# Patient Record
Sex: FEMALE | Race: WHITE | Employment: FULL TIME | URBAN - METROPOLITAN AREA
[De-identification: names, ages, dates, MRNs, and addresses within clinical notes are randomized per-mention and may not be internally consistent; named-entity substitution may affect disease eponyms.]

---

## 2017-09-06 ENCOUNTER — HOSPITAL ENCOUNTER (OUTPATIENT)
Age: 52
Setting detail: SPECIMEN
Discharge: HOME | End: 2017-09-06
Payer: COMMERCIAL

## 2017-09-06 ENCOUNTER — OFFICE VISIT (OUTPATIENT)
Dept: OBGYN CLINIC | Age: 52
End: 2017-09-06
Payer: COMMERCIAL

## 2017-09-06 VITALS — WEIGHT: 168 LBS | DIASTOLIC BLOOD PRESSURE: 64 MMHG | SYSTOLIC BLOOD PRESSURE: 120 MMHG

## 2017-09-06 DIAGNOSIS — Z01.419 VISIT FOR GYNECOLOGIC EXAMINATION: Primary | ICD-10-CM

## 2017-09-06 DIAGNOSIS — Z12.31 VISIT FOR SCREENING MAMMOGRAM: ICD-10-CM

## 2017-09-06 PROCEDURE — 99396 PREV VISIT EST AGE 40-64: CPT | Performed by: OBSTETRICS & GYNECOLOGY

## 2017-09-06 RX ORDER — NEOMYCIN SULFATE, POLYMYXIN B SULFATE AND DEXAMETHASONE 3.5; 10000; 1 MG/ML; [USP'U]/ML; MG/ML
SUSPENSION/ DROPS OPHTHALMIC
COMMUNITY
Start: 2017-08-25 | End: 2017-09-06 | Stop reason: ALTCHOICE

## 2017-09-07 LAB
HPV SAMPLE: NORMAL
HPV SOURCE: NORMAL
HPV, GENOTYPE 16: NOT DETECTED
HPV, GENOTYPE 18: NOT DETECTED
HPV, HIGH RISK OTHER: NOT DETECTED
HPV, INTERPRETATION: NORMAL

## 2017-09-11 LAB — CYTOLOGY REPORT: NORMAL

## 2018-11-13 ENCOUNTER — HOSPITAL ENCOUNTER (OUTPATIENT)
Dept: MAMMOGRAPHY | Age: 53
Discharge: HOME | End: 2018-11-15
Payer: COMMERCIAL

## 2018-11-13 DIAGNOSIS — Z12.39 BREAST CANCER SCREENING: ICD-10-CM

## 2018-11-13 PROCEDURE — 77063 BREAST TOMOSYNTHESIS BI: CPT

## 2021-05-25 ENCOUNTER — HOSPITAL ENCOUNTER (OUTPATIENT)
Dept: MAMMOGRAPHY | Age: 56
Discharge: HOME | End: 2021-05-27
Payer: COMMERCIAL

## 2021-05-25 DIAGNOSIS — Z12.31 VISIT FOR SCREENING MAMMOGRAM: ICD-10-CM

## 2021-05-25 PROCEDURE — 77063 BREAST TOMOSYNTHESIS BI: CPT

## 2022-12-09 ENCOUNTER — HOSPITAL ENCOUNTER (OUTPATIENT)
Dept: MAMMOGRAPHY | Age: 57
Discharge: HOME | End: 2022-12-09
Payer: COMMERCIAL

## 2022-12-09 VITALS — WEIGHT: 185 LBS | HEIGHT: 61 IN | BODY MASS INDEX: 34.93 KG/M2

## 2022-12-09 DIAGNOSIS — Z12.31 SCREENING MAMMOGRAM FOR BREAST CANCER: ICD-10-CM

## 2022-12-09 PROCEDURE — 77067 SCR MAMMO BI INCL CAD: CPT

## 2025-07-21 ENCOUNTER — HOSPITAL ENCOUNTER (OUTPATIENT)
Age: 60
Setting detail: THERAPIES SERIES
End: 2025-07-21
Payer: COMMERCIAL

## 2025-07-23 ENCOUNTER — HOSPITAL ENCOUNTER (OUTPATIENT)
Age: 60
Setting detail: THERAPIES SERIES
Discharge: HOME | End: 2025-07-23
Payer: COMMERCIAL

## 2025-07-23 PROCEDURE — 97162 PT EVAL MOD COMPLEX 30 MIN: CPT

## 2025-07-23 PROCEDURE — 97110 THERAPEUTIC EXERCISES: CPT

## 2025-07-23 NOTE — CONSULTS
[facility]  [address], [address]  [facility] [address]  [phone]  F: [phone]     Physical Therapy Upper Extremity Evaluation    Date:  2025  Patient: Giovanna Diop  : 1965  MRN: [id number]  Physician: Matthew Parada MD     Insurance: [employer]; $2000.00 ded/$1317.18 left/100% AFTER DED MET   VISITS BMN   Medical Diagnosis: (R) biceps tendinitis M75.21  Rehab Codes: (R) elbow pain M25.521   Onset Date: referral 7/15/25   Next 's appt: PRN  Visit Count:    Cancel/No Show: 0/0    Subjective:   Patient presents to therapy with complaints of pain in the (R) anterior shoulder and (R) biceps region.  Patient noted onset of symptoms starting about 3 weeks ago with doing repetitive overhead lifting of weights- including  type press with 15# weight.  Patient noted increased complaints of pain since that time- which prompted MD appointment and referral to therapy dated 7/15/25.  Patient noted that she has not been doing over the counter anti inflammatory medication as prescribed, but has been resting/avoiding overhead activity.  Patient currently notes difficulty with overhead reaching over more than ~ 80 degrees elevation of the shoulder, is limited with laying on side including on the (R) side, and was limited with full strength testing with clinical testing.  Additionally noting intermittent symptoms into the (R) forearm as well.  CC: complaints of (R) sided anterior shoulder joint discomfort, (R) biceps discomfort  HPI: insidious onset of symptoms with overuse ~3 weeks ago prompted MD appointment and referral to therapy dated 7/15/25    PMHx: [] Unremarkable [] Diabetes [] HTN  [] Pacemaker   [] MI/Heart Problems [] Cancer [] Arthritis [] Other:              [x] Refer to full medical chart  In [facility]     Medical History    No past medical history on file.      Other Medical History    Diagnosis Date Comment Source

## 2025-07-28 ENCOUNTER — HOSPITAL ENCOUNTER (OUTPATIENT)
Age: 60
Setting detail: THERAPIES SERIES
Discharge: HOME | End: 2025-07-28
Payer: COMMERCIAL

## 2025-07-28 PROCEDURE — 97110 THERAPEUTIC EXERCISES: CPT

## 2025-07-28 NOTE — FLOWSHEET NOTE
[facility]  [address], [address]  [place] [facility] [address]  [phone]  F: [phone]     Physical Therapy Daily Treatment Note    Date:  2025  Patient Name:  Giovanna Diop    :  1965  MRN: [id number]  Physician:  Matthew Parada MD                                 Insurance: [employer]; $2000.00 ded/$1317.18 left/100% AFTER DED MET   VISITS BMN   Medical Diagnosis: (R) biceps tendinitis M75.21                 Rehab Codes: (R) elbow pain M25.521   Onset Date: referral 7/15/25             Next 's appt: PRN  Visit# / total visits: ; Progress note  patient due at visit 10     Cancels/No Shows: 0/0    Subjective:    Pain:  [x] Yes  [] No Location: right bicep elbow  Pain Rating: (0-10 scale) 6 /10  Pain altered Tx:  [x] No  [] Yes  Action:  Comments:     She presents noting she feels a bit better today- able to lift right UE overhead with less discomfort today. She also feels tylenol/ ibuprofen seems to be helpful now (neither had provided relief upon initially injuring UE).     Objective:         Precautions:    none      Treatment provided:         Exercise Reps/ Time Weight/ Level Comments                      Biceps curls supinated, neutral 15x 4#     Rows 3 way X15 each red     Fwd punches  X15  red Added 7/28         diagonal flex/ext x10 No weight flex/ yellow band for ext Added 7/28   Shoulder extension 2 x 10 red           AROM flexion/ abd? X15 each No weight Added 7/28   Prone ext/abd/flex X10 each  Added 7/28         Right shoulder mobs Completed x4' plus PROM                         Response to treatment: Some slight discomfort at end range stretching and with some of the tubing/ prone ex's, however not enough to stop exercise. Encouraged patient to ice shoulder as needed (not completed in clinic today per time restrictions/ needed to RTW).       Treatment Charges: Mins Units   []  Modalities     [x]  Ther Exercise 23' 2

## 2025-07-31 ENCOUNTER — HOSPITAL ENCOUNTER (OUTPATIENT)
Age: 60
Setting detail: THERAPIES SERIES
Discharge: HOME | End: 2025-07-31
Payer: COMMERCIAL

## 2025-07-31 PROCEDURE — 97140 MANUAL THERAPY 1/> REGIONS: CPT

## 2025-07-31 PROCEDURE — 97110 THERAPEUTIC EXERCISES: CPT

## 2025-07-31 NOTE — FLOWSHEET NOTE
[facility]  Therapy  [address], [address]  [place] [facility] [address]  [phone]  F: [phone]     Physical Therapy Daily Treatment Note    Date:  2025  Patient Name:  Giovanna Diop    :  1965  MRN: [id number]  Physician:  Matthew Parada MD                                 Insurance: [employer]; $2000.00 ded/$1317.18 left/100% AFTER DED MET   VISITS BMN   Medical Diagnosis: (R) biceps tendinitis M75.21                 Rehab Codes: (R) elbow pain M25.521   Onset Date: referral 7/15/25             Next 's appt: PRN    Visit# / total visits: 3/12; Progress note  patient due at visit 10       Cancels/No Shows: 0/0    Subjective:    Pain:  [x] Yes  [] No Location: right bicep elbow  Pain Rating: (0-10 scale) 5/10  Pain altered Tx:  [x] No  [] Yes  Action:    Comments:   Pt reports that her right shoulder sx have decreased some but are present most of the day.  Sx are more lateral but does have some anterior shoulder pain as well, awakens at night if she is lying on her right shoulder. Pt states she recently received a new desk that is higher and she is unable to raise her chair any higher.     Objective: no significant postural considerations, decreased lateral shoulder sx with UE distraction.  Slight point-tenderness with palpation to right biceps tendon proximal insertion.  Sx clinically present as right shoulder impingement with secondary biceps tendonitis.          Precautions:    none      Treatment provided:     Exercise Reps/ Time Weight/ Level Comments   UBE  4'    fwd/retro 2 min ea             Biceps curls supinated, neutral 15x 4#     Rows 3 way X15 each red     Fwd punches  X15  red          diagonal flex/ext X 15 ea  No weight flex/ yellow band for ext    Shoulder extension 2 x 10 red           AROM flexion/ abd X15 each No weight    Prone ext/abd/flex X10 each           Right shoulder mobs Completed x4' plus PROM

## 2025-08-04 ENCOUNTER — HOSPITAL ENCOUNTER (OUTPATIENT)
Age: 60
Setting detail: THERAPIES SERIES
Discharge: HOME | End: 2025-08-04
Payer: COMMERCIAL

## 2025-08-04 PROCEDURE — 97110 THERAPEUTIC EXERCISES: CPT

## 2025-08-04 PROCEDURE — 97140 MANUAL THERAPY 1/> REGIONS: CPT

## 2025-08-06 ENCOUNTER — APPOINTMENT (OUTPATIENT)
Age: 60
End: 2025-08-06
Payer: COMMERCIAL

## 2025-08-11 ENCOUNTER — HOSPITAL ENCOUNTER (OUTPATIENT)
Age: 60
Setting detail: THERAPIES SERIES
Discharge: HOME | End: 2025-08-11
Payer: COMMERCIAL

## 2025-08-11 PROCEDURE — 97110 THERAPEUTIC EXERCISES: CPT

## 2025-08-11 PROCEDURE — 97140 MANUAL THERAPY 1/> REGIONS: CPT

## 2025-08-13 ENCOUNTER — HOSPITAL ENCOUNTER (OUTPATIENT)
Age: 60
Setting detail: THERAPIES SERIES
Discharge: HOME | End: 2025-08-13
Payer: COMMERCIAL

## 2025-08-13 PROCEDURE — 97110 THERAPEUTIC EXERCISES: CPT

## 2025-08-13 PROCEDURE — 97140 MANUAL THERAPY 1/> REGIONS: CPT

## 2025-08-18 ENCOUNTER — HOSPITAL ENCOUNTER (OUTPATIENT)
Age: 60
Setting detail: THERAPIES SERIES
Discharge: HOME | End: 2025-08-18
Payer: COMMERCIAL

## 2025-08-18 PROCEDURE — 97140 MANUAL THERAPY 1/> REGIONS: CPT

## 2025-08-18 PROCEDURE — 97110 THERAPEUTIC EXERCISES: CPT

## 2025-08-20 ENCOUNTER — HOSPITAL ENCOUNTER (OUTPATIENT)
Age: 60
Setting detail: THERAPIES SERIES
Discharge: HOME | End: 2025-08-20
Payer: COMMERCIAL

## 2025-08-20 PROCEDURE — 97140 MANUAL THERAPY 1/> REGIONS: CPT

## 2025-08-20 PROCEDURE — 97110 THERAPEUTIC EXERCISES: CPT

## 2025-08-26 ENCOUNTER — HOSPITAL ENCOUNTER (OUTPATIENT)
Age: 60
Setting detail: THERAPIES SERIES
Discharge: HOME | End: 2025-08-26
Payer: COMMERCIAL

## 2025-08-26 PROCEDURE — 97110 THERAPEUTIC EXERCISES: CPT

## 2025-08-26 PROCEDURE — 97140 MANUAL THERAPY 1/> REGIONS: CPT
